# Patient Record
Sex: FEMALE | Race: WHITE
[De-identification: names, ages, dates, MRNs, and addresses within clinical notes are randomized per-mention and may not be internally consistent; named-entity substitution may affect disease eponyms.]

---

## 2018-07-05 NOTE — DIAGNOSTIC IMAGING REPORT
INDICATION:   Thyroid enlargement.



TECHNIQUE: Grayscale sonographic images of the thyroid gland.



CORRELATION STUDY:  None



FINDINGS:

RIGHT LOBE:  4.9 x 1.5 x 1.8 cm.  

There is normal echotexture about the right lobe.



LEFT LOBE:  4.5 x 1.6 x 1.4 cm.  

There is normal echotexture about the left lobe.



Isthmus appears unremarkable.



IMPRESSION:

Borderline enlarged thyroid gland. Otherwise, unremarkable

appearing thyroid ultrasound examination.



(Normal gland size:  4-5 x 2 x 2 cm)



Dictated by: 



  Dictated on workstation # [id number]

## 2018-07-11 NOTE — DIAGNOSTIC IMAGING REPORT
Indication: Goiter.



Patient was administered 204 mCi I-123 and a 4 hour and 24 uptake

was obtained. Thyroid scan was also performed.



4 hour uptake is 26%. 24 hour uptake is 45%. Normal 24-hour

uptake is typically 10-30%.



Thyroid scan demonstrates fairly homogeneous uptake of activity

throughout both lobes of the thyroid gland. No definite hot or

cold nodules are seen.



Impression: Mildly elevated thyroid uptake suggestive of

hyperthyroidism. No other significant abnormality is seen.



Dictated by: 



  Dictated on workstation # [id number]

## 2021-03-26 ENCOUNTER — HOSPITAL ENCOUNTER (OUTPATIENT)
Dept: HOSPITAL 75 - LDRP | Age: 26
Discharge: HOME | End: 2021-03-26
Attending: OBSTETRICS & GYNECOLOGY
Payer: COMMERCIAL

## 2021-03-26 VITALS — DIASTOLIC BLOOD PRESSURE: 65 MMHG | SYSTOLIC BLOOD PRESSURE: 107 MMHG

## 2021-03-26 VITALS — SYSTOLIC BLOOD PRESSURE: 126 MMHG | DIASTOLIC BLOOD PRESSURE: 77 MMHG

## 2021-03-26 VITALS — HEIGHT: 60.98 IN | WEIGHT: 175.93 LBS | BODY MASS INDEX: 33.22 KG/M2

## 2021-03-26 DIAGNOSIS — Z3A.35: ICD-10-CM

## 2021-03-29 ENCOUNTER — HOSPITAL ENCOUNTER (INPATIENT)
Dept: HOSPITAL 75 - WSO | Age: 26
LOS: 1 days | Discharge: HOME | End: 2021-03-30
Attending: OBSTETRICS & GYNECOLOGY | Admitting: OBSTETRICS & GYNECOLOGY
Payer: COMMERCIAL

## 2021-03-29 VITALS — SYSTOLIC BLOOD PRESSURE: 130 MMHG | DIASTOLIC BLOOD PRESSURE: 61 MMHG

## 2021-03-29 VITALS — DIASTOLIC BLOOD PRESSURE: 64 MMHG | SYSTOLIC BLOOD PRESSURE: 123 MMHG

## 2021-03-29 VITALS — SYSTOLIC BLOOD PRESSURE: 122 MMHG | DIASTOLIC BLOOD PRESSURE: 72 MMHG

## 2021-03-29 VITALS — SYSTOLIC BLOOD PRESSURE: 128 MMHG | DIASTOLIC BLOOD PRESSURE: 78 MMHG

## 2021-03-29 VITALS — DIASTOLIC BLOOD PRESSURE: 67 MMHG | SYSTOLIC BLOOD PRESSURE: 131 MMHG

## 2021-03-29 VITALS — DIASTOLIC BLOOD PRESSURE: 73 MMHG | SYSTOLIC BLOOD PRESSURE: 120 MMHG

## 2021-03-29 VITALS — DIASTOLIC BLOOD PRESSURE: 71 MMHG | SYSTOLIC BLOOD PRESSURE: 118 MMHG

## 2021-03-29 VITALS — DIASTOLIC BLOOD PRESSURE: 68 MMHG | SYSTOLIC BLOOD PRESSURE: 126 MMHG

## 2021-03-29 VITALS — SYSTOLIC BLOOD PRESSURE: 126 MMHG | DIASTOLIC BLOOD PRESSURE: 76 MMHG

## 2021-03-29 VITALS — SYSTOLIC BLOOD PRESSURE: 127 MMHG | DIASTOLIC BLOOD PRESSURE: 72 MMHG

## 2021-03-29 VITALS — DIASTOLIC BLOOD PRESSURE: 61 MMHG | SYSTOLIC BLOOD PRESSURE: 121 MMHG

## 2021-03-29 VITALS — SYSTOLIC BLOOD PRESSURE: 122 MMHG | DIASTOLIC BLOOD PRESSURE: 73 MMHG

## 2021-03-29 VITALS — SYSTOLIC BLOOD PRESSURE: 119 MMHG | DIASTOLIC BLOOD PRESSURE: 64 MMHG

## 2021-03-29 VITALS — SYSTOLIC BLOOD PRESSURE: 120 MMHG | DIASTOLIC BLOOD PRESSURE: 59 MMHG

## 2021-03-29 VITALS — DIASTOLIC BLOOD PRESSURE: 73 MMHG | SYSTOLIC BLOOD PRESSURE: 124 MMHG

## 2021-03-29 VITALS — SYSTOLIC BLOOD PRESSURE: 123 MMHG | DIASTOLIC BLOOD PRESSURE: 65 MMHG

## 2021-03-29 VITALS — SYSTOLIC BLOOD PRESSURE: 122 MMHG | DIASTOLIC BLOOD PRESSURE: 69 MMHG

## 2021-03-29 VITALS — DIASTOLIC BLOOD PRESSURE: 74 MMHG | SYSTOLIC BLOOD PRESSURE: 127 MMHG

## 2021-03-29 VITALS — SYSTOLIC BLOOD PRESSURE: 128 MMHG | DIASTOLIC BLOOD PRESSURE: 74 MMHG

## 2021-03-29 VITALS — DIASTOLIC BLOOD PRESSURE: 73 MMHG | SYSTOLIC BLOOD PRESSURE: 125 MMHG

## 2021-03-29 VITALS — SYSTOLIC BLOOD PRESSURE: 123 MMHG | DIASTOLIC BLOOD PRESSURE: 67 MMHG

## 2021-03-29 VITALS — DIASTOLIC BLOOD PRESSURE: 67 MMHG | SYSTOLIC BLOOD PRESSURE: 128 MMHG

## 2021-03-29 VITALS — DIASTOLIC BLOOD PRESSURE: 81 MMHG | SYSTOLIC BLOOD PRESSURE: 136 MMHG

## 2021-03-29 VITALS — SYSTOLIC BLOOD PRESSURE: 127 MMHG | DIASTOLIC BLOOD PRESSURE: 58 MMHG

## 2021-03-29 VITALS — DIASTOLIC BLOOD PRESSURE: 55 MMHG | SYSTOLIC BLOOD PRESSURE: 113 MMHG

## 2021-03-29 VITALS — SYSTOLIC BLOOD PRESSURE: 133 MMHG | DIASTOLIC BLOOD PRESSURE: 60 MMHG

## 2021-03-29 VITALS — DIASTOLIC BLOOD PRESSURE: 59 MMHG | SYSTOLIC BLOOD PRESSURE: 120 MMHG

## 2021-03-29 VITALS — SYSTOLIC BLOOD PRESSURE: 118 MMHG | DIASTOLIC BLOOD PRESSURE: 66 MMHG

## 2021-03-29 VITALS — SYSTOLIC BLOOD PRESSURE: 127 MMHG | DIASTOLIC BLOOD PRESSURE: 63 MMHG

## 2021-03-29 VITALS — SYSTOLIC BLOOD PRESSURE: 126 MMHG | DIASTOLIC BLOOD PRESSURE: 69 MMHG

## 2021-03-29 VITALS — DIASTOLIC BLOOD PRESSURE: 67 MMHG | SYSTOLIC BLOOD PRESSURE: 124 MMHG

## 2021-03-29 VITALS — SYSTOLIC BLOOD PRESSURE: 128 MMHG | DIASTOLIC BLOOD PRESSURE: 71 MMHG

## 2021-03-29 VITALS — DIASTOLIC BLOOD PRESSURE: 72 MMHG | SYSTOLIC BLOOD PRESSURE: 137 MMHG

## 2021-03-29 VITALS — DIASTOLIC BLOOD PRESSURE: 67 MMHG | SYSTOLIC BLOOD PRESSURE: 120 MMHG

## 2021-03-29 VITALS — SYSTOLIC BLOOD PRESSURE: 129 MMHG | DIASTOLIC BLOOD PRESSURE: 80 MMHG

## 2021-03-29 VITALS — DIASTOLIC BLOOD PRESSURE: 65 MMHG | SYSTOLIC BLOOD PRESSURE: 122 MMHG

## 2021-03-29 VITALS — SYSTOLIC BLOOD PRESSURE: 120 MMHG | DIASTOLIC BLOOD PRESSURE: 64 MMHG

## 2021-03-29 VITALS — DIASTOLIC BLOOD PRESSURE: 75 MMHG | SYSTOLIC BLOOD PRESSURE: 133 MMHG

## 2021-03-29 VITALS — DIASTOLIC BLOOD PRESSURE: 63 MMHG | SYSTOLIC BLOOD PRESSURE: 137 MMHG

## 2021-03-29 VITALS — SYSTOLIC BLOOD PRESSURE: 140 MMHG | DIASTOLIC BLOOD PRESSURE: 89 MMHG

## 2021-03-29 VITALS — DIASTOLIC BLOOD PRESSURE: 72 MMHG | SYSTOLIC BLOOD PRESSURE: 122 MMHG

## 2021-03-29 VITALS — DIASTOLIC BLOOD PRESSURE: 61 MMHG | SYSTOLIC BLOOD PRESSURE: 125 MMHG

## 2021-03-29 VITALS — SYSTOLIC BLOOD PRESSURE: 128 MMHG | DIASTOLIC BLOOD PRESSURE: 59 MMHG

## 2021-03-29 VITALS — WEIGHT: 174.17 LBS | HEIGHT: 60.98 IN | BODY MASS INDEX: 32.88 KG/M2

## 2021-03-29 VITALS — SYSTOLIC BLOOD PRESSURE: 129 MMHG | DIASTOLIC BLOOD PRESSURE: 66 MMHG

## 2021-03-29 DIAGNOSIS — Z3A.36: ICD-10-CM

## 2021-03-29 LAB
BASOPHILS # BLD AUTO: 0 10^3/UL (ref 0–0.1)
BASOPHILS NFR BLD AUTO: 0 % (ref 0–10)
EOSINOPHIL # BLD AUTO: 0.1 10^3/UL (ref 0–0.3)
EOSINOPHIL NFR BLD AUTO: 1 % (ref 0–10)
HCT VFR BLD CALC: 29 % (ref 35–52)
HGB BLD-MCNC: 8.6 G/DL (ref 11.5–16)
LYMPHOCYTES # BLD AUTO: 1.4 10^3/UL (ref 1–4)
LYMPHOCYTES NFR BLD AUTO: 13 % (ref 12–44)
MANUAL DIFFERENTIAL PERFORMED BLD QL: NO
MCH RBC QN AUTO: 22 PG (ref 25–34)
MCHC RBC AUTO-ENTMCNC: 30 G/DL (ref 32–36)
MCV RBC AUTO: 74 FL (ref 80–99)
MONOCYTES # BLD AUTO: 0.6 10^3/UL (ref 0–1)
MONOCYTES NFR BLD AUTO: 6 % (ref 0–12)
NEUTROPHILS # BLD AUTO: 8.9 10^3/UL (ref 1.8–7.8)
NEUTROPHILS NFR BLD AUTO: 80 % (ref 42–75)
PLATELET # BLD: 368 10^3/UL (ref 130–400)
PMV BLD AUTO: 11.2 FL (ref 9–12.2)
WBC # BLD AUTO: 11.1 10^3/UL (ref 4.3–11)

## 2021-03-29 PROCEDURE — 85025 COMPLETE CBC W/AUTO DIFF WBC: CPT

## 2021-03-29 PROCEDURE — 86900 BLOOD TYPING SEROLOGIC ABO: CPT

## 2021-03-29 PROCEDURE — 86901 BLOOD TYPING SEROLOGIC RH(D): CPT

## 2021-03-29 PROCEDURE — 86850 RBC ANTIBODY SCREEN: CPT

## 2021-03-29 PROCEDURE — 36415 COLL VENOUS BLD VENIPUNCTURE: CPT

## 2021-03-29 RX ADMIN — SODIUM CHLORIDE, SODIUM LACTATE, POTASSIUM CHLORIDE, CALCIUM CHLORIDE, AND DEXTROSE MONOHYDRATE SCH MLS/HR: 600; 310; 30; 20; 5 INJECTION, SOLUTION INTRAVENOUS at 16:48

## 2021-03-29 RX ADMIN — KETOROLAC TROMETHAMINE SCH MG: 30 INJECTION, SOLUTION INTRAMUSCULAR; INTRAVENOUS at 23:43

## 2021-03-29 RX ADMIN — Medication SCH ML: at 23:43

## 2021-03-29 RX ADMIN — SODIUM CHLORIDE, SODIUM LACTATE, POTASSIUM CHLORIDE, CALCIUM CHLORIDE, AND DEXTROSE MONOHYDRATE SCH MLS/HR: 600; 310; 30; 20; 5 INJECTION, SOLUTION INTRAVENOUS at 20:11

## 2021-03-29 RX ADMIN — SODIUM CHLORIDE, SODIUM LACTATE, POTASSIUM CHLORIDE, CALCIUM CHLORIDE, AND DEXTROSE MONOHYDRATE SCH MLS/HR: 600; 310; 30; 20; 5 INJECTION, SOLUTION INTRAVENOUS at 23:33

## 2021-03-29 RX ADMIN — IBUPROFEN SCH MG: 800 TABLET, FILM COATED ORAL at 23:45

## 2021-03-29 NOTE — PHYSICIAN QUERY-FINAL DX
MAI MEAD 3/29/21 0803:


Clinic Account Progress/Dx


Physician Query:


Please give diagnosis





Please include # weeks gestation


Date of Service





Mar 26, 2021 at 21:43





GOLDEN COOL MD 3/29/21 1742:


Clinic Account Progress/Dx


DIAGNOSIS:


Diagnosis


35 weeks gestation with false labor











MAI MEAD                    Mar 29, 2021 08:03


GOLDEN COOL MD       Mar 29, 2021 17:42

## 2021-03-29 NOTE — DISCHARGE INST-SURGICAL
Discharge Inst-Surgical


Depart Medication/Instructions


New, Converted or Re-Newed RX:  RX on Chart





Consults/Follow Up


Patient Instructions:  


As directed


Orders & Referrals





Follow Up Appt:


Call to make follow up appt. for patient in 4 weeks.





Activity 


Per routine post vaginal delivery instructions.





Please call in RX to patient pharmacy.





Diet as tolerated





Patient may shower or tub bathe as desired.





Activity


Activity as Tolerated:  No





Diet


Discharge Diet:  No Restrictions











GOLDEN COOL MD       Mar 29, 2021 17:45

## 2021-03-29 NOTE — HISTORY & PHYSICAL
History and Physical


Date Seen by Provider:  Mar 29, 2021


Time Seen by Provider:  17:35


This patient is a 25-year-old  3 para 2 white female with 2 prior 

delivery before 36 weeks gestation.  She currently is 36 weeks gestation and in 

the active labor.  Her GBS culture after 35 weeks gestation was negative.  She 

denies rupture membranes or bleeding.  She does feel contractions of strength 

and intensity that remind her of her previous labors and deliveries





Allergies are none





Medications are prenatal vitamins





Medical social and surgical history is all per the antepartum record





HEENT exam is normal


Neck is supple no lymphadenopathy no thyromegaly


Abdomen is gravid soft nontender nondistended


Extremities show no clubbing cyanosis.  There is no Homans' sign.





Pelvic exam shows a cervix 5 cm dilated 70 to 80% effaced 0-1 station vertex 

presentation with an intact membrane that is ruptured and artificially releasing

clear fluid





Fetal monitor shows contractions every 2 minutes with a normal fetal heart rate 

pattern





Assessment and plan   36-week pregnancy in a patient with 2 prior deliveries 

prior to 37 weeks who is now an active labor she is being managed expectantly we

anticipate a vaginal delivery


36 weeks with  labor





Allergies and Home Medications


Allergies


Coded Allergies:  


     No Known Drug Allergies (Unverified , 10/12/14)





Home Medications


Prenatal Vit No.124/Iron/FA 1 Each Tablet, 1 EACH PO DAILY, (Reported)





Patient Home Medication List


Home Medication List Reviewed:  Yes











GOLDEN COOL MD       Mar 29, 2021 17:37

## 2021-03-30 VITALS — DIASTOLIC BLOOD PRESSURE: 72 MMHG | SYSTOLIC BLOOD PRESSURE: 124 MMHG

## 2021-03-30 VITALS — SYSTOLIC BLOOD PRESSURE: 124 MMHG | DIASTOLIC BLOOD PRESSURE: 75 MMHG

## 2021-03-30 VITALS — SYSTOLIC BLOOD PRESSURE: 118 MMHG | DIASTOLIC BLOOD PRESSURE: 62 MMHG

## 2021-03-30 VITALS — SYSTOLIC BLOOD PRESSURE: 110 MMHG | DIASTOLIC BLOOD PRESSURE: 57 MMHG

## 2021-03-30 VITALS — SYSTOLIC BLOOD PRESSURE: 98 MMHG | DIASTOLIC BLOOD PRESSURE: 53 MMHG

## 2021-03-30 RX ADMIN — IBUPROFEN SCH MG: 800 TABLET, FILM COATED ORAL at 05:37

## 2021-03-30 RX ADMIN — KETOROLAC TROMETHAMINE SCH MG: 30 INJECTION, SOLUTION INTRAMUSCULAR; INTRAVENOUS at 05:37

## 2021-03-30 RX ADMIN — SODIUM CHLORIDE, SODIUM LACTATE, POTASSIUM CHLORIDE, CALCIUM CHLORIDE, AND DEXTROSE MONOHYDRATE SCH MLS/HR: 600; 310; 30; 20; 5 INJECTION, SOLUTION INTRAVENOUS at 00:29

## 2021-03-30 RX ADMIN — Medication SCH ML: at 05:37

## 2021-03-30 NOTE — OPERATIVE REPORT
DATE OF SERVICE:  2021



The patient delivered by  spontaneous vaginal delivery at 36 weeks

gestation, a viable male infant with Apgars of 7 and 8 at 1 and 5 minutes

respectively, weight of 7 pounds 6 ounces, birth time of 2248 and a cord blood

pH of 7.28.  The infant was bulb suctioned on delivery of the head and again on

completion of delivery.  Umbilical cord was doubly clamped, father cut the cord,

the baby was passed to Dr. Conley at the pediatric warmer due to the prematurity. 

The infant was under the care of Dr. Conley from that point.



The placenta delivered spontaneously [place].  It was normal with a 3-vessel

cord.  The cervix, vagina, rectum, and perineum were examined and found intact,

except for some minor bilateral superficial abrasions that were hemostatic and 
required no

repair.



Sponge and needle counts were correct at completion of the delivery.  The

estimated blood loss was around 150 mL.  The patient tolerated the delivery well

and remained in the LDR for recovery.  The baby was taken to the full term

nursery under the care of Dr. Conley.





Job ID: [id number]

DocumentID: [id number]

Dictated Date:  2021 08:05:38

Transcription Date: 2021 12:21:09

Dictated By: GOLDEN COOL MD

MTDD

## 2021-03-30 NOTE — ANESTHESIA-REGIONAL POST-OP
Regional


Patient Condition


Mental Status:  Alert, Oriented x3


Circulation:  Same as Pre-Op


Headache:  Absent


Sensation:  Full Recovery


Motor Block:  Absent





Post Op Complications


Complications


None





Follow Up Care/Instructions


Patient Instructions


None needed.





Anesthesia/Patient Condition


Patient is doing well, no complaints, stable vital signs, no apparent adverse 

anesthesia problems.   


No complications reported per nursing.


D/C home per [facility] Criteria:  DEEPALI [place] CRNA           Mar 30, 2021 07:08

## 2021-03-30 NOTE — PROGRESS NOTE
Standard Progress Note


Progress Notes/Assess & Plan


Date Seen by a Provider:  Mar 30, 2021


Time Seen by a Provider:  07:44


Progress/Assessment & Plan


This patient is without complaint.  She is ambulating, voiding, tolerating oral 

intake well and has good pain control.








Vital Signs








  Date Time  Temp Pulse Resp B/P (MAP) Pulse Ox O2 Delivery O2 Flow Rate FiO2


 


3/30/21 05:38 36.3 72 18 110/57 (74) 99 Room Air  


 


3/30/21 01:00 36.3 76 18 124/75 (91)  Room Air  


 


3/30/21 00:30  80 18 118/62 (80)  Room Air  


 


3/30/21 00:00 36.4 75 18 124/72 (89)  Room Air  


 


3/29/21 23:45  76 18 125/73 (90)  Room Air  


 


3/29/21 23:30 36.3 82 18 124/67 (86)  Room Air  


 


3/29/21 23:15 36.5 81 18 121/61 (81)  Room Air  


 


3/29/21 23:00 36.6 94 18 120/59 (79)  Room Air  


 


3/29/21 22:45  100 18 137/72 (93)  Room Air  


 


3/29/21 22:30  85 18 126/68 (87) 100 Room Air  


 


3/29/21 22:15  85 18 119/64 (82) 100 Room Air  


 


3/29/21 22:00  82 18 122/72 (89) 100 Room Air  


 


3/29/21 21:45  79 18 120/64 (82) 99 Room Air  


 


3/29/21 21:30  84 18 120/67 (84) 98 Room Air  


 


3/29/21 21:15  76 18 122/65 (84) 98 Room Air  


 


3/29/21 21:00 36.6 77 18 127/63 (84) 99 Room Air  


 


3/29/21 20:45  85 18 122/69 (86) 98 Room Air  


 


3/29/21 20:30  83 18 126/69 (88) 98 Room Air  


 


3/29/21 20:15  95 18 127/74 (91) 96 Room Air  


 


3/29/21 20:00  98 18 122/72 (89) 97 Room Air  


 


3/29/21 19:45  99 18 126/76 (93) 98 Room Air  


 


3/29/21 19:40  88 18 128/78 (95) 97 Room Air  


 


3/29/21 19:35 37.0 87 18 129/80 (96) 97 Room Air  


 


3/29/21 19:30  90 18 123/65 (84) 96 Room Air  


 


3/29/21 19:25  88 18 123/64 (83) 96 Room Air  


 


3/29/21 19:20  100 18 120/73 (89) 96 Room Air  


 


3/29/21 19:15  99 18 118/66 (83) 95 Room Air  


 


3/29/21 19:10  90 18 120/59 (79) 97 Room Air  


 


3/29/21 19:08  93 18 130/61 (84)  Room Air  


 


3/29/21 19:05  92 18 128/59 (82)  Room Air  


 


3/29/21 19:03  99 18 127/58 (81)  Room Air  


 


3/29/21 19:00  95 18 133/60 (84)  Room Air  


 


3/29/21 18:58  114 18 137/63 (87) 97 Room Air  


 


3/29/21 18:55  106 18 129/66 (87) 97 Room Air  


 


3/29/21 18:53  110 18 131/67 (88) 97 Room Air  


 


3/29/21 18:51  111 18 128/71 (90) 97 Room Air  


 


3/29/21 18:49  105 18 125/61 (82) 97 Room Air  


 


3/29/21 18:48  106 18 128/67 (87) 97 Room Air  


 


3/29/21 18:46  107 18 124/73 (90) 97 Room Air  


 


3/29/21 18:45  105 18 128/74 (92) 97 Room Air  


 


3/29/21 18:43  100 18 127/72 (90) 97 Room Air  


 


3/29/21 18:41  93 18 122/73 (89) 97 Room Air  


 


3/29/21 18:39  106 18 140/89 (106) 97 Room Air  


 


3/29/21 18:37  103 18 136/81 (99) 97 Room Air  


 


3/29/21 18:35  100 18 133/75 (94) 97 Room Air  


 


3/29/21 18:30  98 18 113/55 (74)  Room Air  


 


3/29/21 18:00  92 18 123/67 (85)  Room Air  


 


3/29/21 15:00 37.1 152 18  96 Room Air  














I & O 


 


 3/30/21





 07:00


 


Intake Total 3000 ml


 


Output Total 0 ml


 


Balance 3000 ml











Vital signs are stable.  Patient is afebrile.





Fundus is firm below the umbilicus and nontender.


Extremities show no clubbing or cyanosis.  There is no Homans' sign.





Assessment and plan      postpartum day #1 status post  spontaneous vagin

al delivery at 36 weeks gestation.  Patient is doing well and is requesting 

discharge.  Her baby was transferred to [facility] last evening.  Patient 

reports her baby is doing quite well.  Plan is for discharge home with follow-up

in clinic


Final Diagnosis


36-week spontaneous vaginal delivery











GOLDEN COOL MD       Mar 30, 2021 07:46